# Patient Record
Sex: MALE | URBAN - METROPOLITAN AREA
[De-identification: names, ages, dates, MRNs, and addresses within clinical notes are randomized per-mention and may not be internally consistent; named-entity substitution may affect disease eponyms.]

---

## 2018-03-06 ENCOUNTER — NURSE TRIAGE (OUTPATIENT)
Dept: NURSING | Facility: CLINIC | Age: 33
End: 2018-03-06

## 2018-03-07 NOTE — TELEPHONE ENCOUNTER
Friend is calling for Fabián. He has leukemia. Friend reports that Fabián is homeless and she has been taking care of him. He is a patient of Dr Mahajan. She reports he is lethargic tonight and is being uncooperative. Friend is not sure if she spelled his name correctly and he won't tell her how to spell it.     Protocol and care advice reviewed.   Caller states understanding of the recommended disposition.  Advised to call back if further questions or concerns.     Kaitlin Escamilla RN/FNA    Reason for Disposition    Difficult to awaken or acting confused  (e.g., disoriented, slurred speech)    Additional Information    Negative: Shock suspected (e.g., cold/pale/clammy skin, too weak to stand, low BP, rapid pulse)    Protocols used: CANCER - FEVER-ADULT-AH